# Patient Record
Sex: FEMALE | Race: WHITE | NOT HISPANIC OR LATINO | URBAN - METROPOLITAN AREA
[De-identification: names, ages, dates, MRNs, and addresses within clinical notes are randomized per-mention and may not be internally consistent; named-entity substitution may affect disease eponyms.]

---

## 2021-08-03 ENCOUNTER — OFFICE VISIT (OUTPATIENT)
Dept: URBAN - METROPOLITAN AREA CLINIC 94 | Facility: CLINIC | Age: 46
End: 2021-08-03

## 2021-08-05 ENCOUNTER — LAB OUTSIDE AN ENCOUNTER (OUTPATIENT)
Dept: URBAN - METROPOLITAN AREA CLINIC 70 | Facility: CLINIC | Age: 46
End: 2021-08-05

## 2021-08-05 ENCOUNTER — OFFICE VISIT (OUTPATIENT)
Dept: URBAN - METROPOLITAN AREA CLINIC 70 | Facility: CLINIC | Age: 46
End: 2021-08-05
Payer: COMMERCIAL

## 2021-08-05 ENCOUNTER — WEB ENCOUNTER (OUTPATIENT)
Dept: URBAN - METROPOLITAN AREA CLINIC 70 | Facility: CLINIC | Age: 46
End: 2021-08-05

## 2021-08-05 DIAGNOSIS — R10.84 GENERALIZED ABDOMINAL PAIN: ICD-10-CM

## 2021-08-05 DIAGNOSIS — R19.4 CHANGE IN BOWEL HABITS: ICD-10-CM

## 2021-08-05 DIAGNOSIS — K59.00 COLONIC CONSTIPATION: ICD-10-CM

## 2021-08-05 PROBLEM — 35298007: Status: ACTIVE | Noted: 2021-08-05

## 2021-08-05 PROCEDURE — 99204 OFFICE O/P NEW MOD 45 MIN: CPT | Performed by: INTERNAL MEDICINE

## 2021-08-05 RX ORDER — FLUTICASONE PROPIONATE AND SALMETEROL 50; 500 UG/1; UG/1
1 PUFF POWDER RESPIRATORY (INHALATION) TWICE A DAY
Status: ACTIVE | COMMUNITY

## 2021-08-05 RX ORDER — HYDROCODONE BITARTRATE AND ACETAMINOPHEN 5; 325 MG/1; MG/1
TABLET ORAL
Qty: 6 | Status: ACTIVE | COMMUNITY

## 2021-08-05 RX ORDER — DICLOFENAC SODIUM 75 MG/1
TABLET, DELAYED RELEASE ORAL
Qty: 60 | Status: ACTIVE | COMMUNITY

## 2021-08-05 RX ORDER — ALBUTEROL SULFATE 90 UG/1
1 PUFF AS NEEDED AEROSOL, METERED RESPIRATORY (INHALATION)
Status: ACTIVE | COMMUNITY

## 2021-08-05 NOTE — HPI-TODAY'S VISIT:
Patient presents today for evaluation of constipation that has been present for several years.  Feels her constipation is progressively become more of a problem.  Defecation occurs about once every 7-10 days. Denies experiencing the urge for defecation.  Voices increased bloating, abdominal discomfort/pain, back pain and nausea.  These symptoms prompt her to take medication to have a BM.  Has tried and failed use of magnesium citrate, OTC laxatives, stool softeners and senna to induce defecation.  Denies signs of GI blood loss.  Voices her last BM as being one day ago.  Stools are usually soft due to medication use with straining still voiced.    Was evaluated in ED at [facility] 2 days ago due to increased abdominal pain to RLQ regions and constipation.  Records reviewed via her phone with the following:  WBC 18.10, HGB 14.1, MCV 93.5, , Sodium 136, Potassium 4.1, Normal LFTs.  CT A/P showed normal appendix, no signs of bowel obstruction or bowel wall thickening, normal gallbladder.  Pelvic Ultrasound showed bilateral complex ovarian cysts.  Patient was discharged home and instructed to follow up with her GYN.  Denies prior colonoscopy.

## 2021-08-06 LAB — TSH: 0.56

## 2021-08-09 ENCOUNTER — OFFICE VISIT (OUTPATIENT)
Dept: URBAN - METROPOLITAN AREA CLINIC 94 | Facility: CLINIC | Age: 46
End: 2021-08-09

## 2021-08-19 ENCOUNTER — OFFICE VISIT (OUTPATIENT)
Dept: URBAN - METROPOLITAN AREA SURGERY CENTER 17 | Facility: SURGERY CENTER | Age: 46
End: 2021-08-19
Payer: COMMERCIAL

## 2021-08-19 DIAGNOSIS — K59.09 CHANGE IN BOWEL MOVEMENTS INTERMITTENT CONSTIPATION. URGENCY IN THE MORNING.: ICD-10-CM

## 2021-08-19 DIAGNOSIS — R10.84 ABDOMINAL CRAMPING, GENERALIZED: ICD-10-CM

## 2021-08-19 PROCEDURE — G8907 PT DOC NO EVENTS ON DISCHARG: HCPCS | Performed by: INTERNAL MEDICINE

## 2021-08-19 PROCEDURE — 45378 DIAGNOSTIC COLONOSCOPY: CPT | Performed by: INTERNAL MEDICINE

## 2021-08-19 RX ORDER — ALBUTEROL SULFATE 90 UG/1
1 PUFF AS NEEDED AEROSOL, METERED RESPIRATORY (INHALATION)
Status: ACTIVE | COMMUNITY

## 2021-08-19 RX ORDER — FLUTICASONE PROPIONATE AND SALMETEROL 50; 500 UG/1; UG/1
1 PUFF POWDER RESPIRATORY (INHALATION) TWICE A DAY
Status: ACTIVE | COMMUNITY

## 2021-08-19 RX ORDER — DICLOFENAC SODIUM 75 MG/1
TABLET, DELAYED RELEASE ORAL
Qty: 60 | Status: ACTIVE | COMMUNITY

## 2021-08-19 RX ORDER — HYDROCODONE BITARTRATE AND ACETAMINOPHEN 5; 325 MG/1; MG/1
TABLET ORAL
Qty: 6 | Status: ACTIVE | COMMUNITY

## 2021-11-16 ENCOUNTER — TELEPHONE ENCOUNTER (OUTPATIENT)
Dept: URBAN - METROPOLITAN AREA CLINIC 70 | Facility: CLINIC | Age: 46
End: 2021-11-16

## 2021-11-16 RX ORDER — LINACLOTIDE 290 UG/1
1 CAPSULE AT LEAST 30 MINUTES BEFORE THE FIRST MEAL OF THE DAY ON AN EMPTY STOMACH CAPSULE, GELATIN COATED ORAL ONCE A DAY
Qty: 90 | Refills: 3 | OUTPATIENT
Start: 2021-11-16

## 2022-02-07 ENCOUNTER — OFFICE VISIT (OUTPATIENT)
Dept: URBAN - METROPOLITAN AREA CLINIC 94 | Facility: CLINIC | Age: 47
End: 2022-02-07
Payer: COMMERCIAL

## 2022-02-07 VITALS
WEIGHT: 144 LBS | DIASTOLIC BLOOD PRESSURE: 78 MMHG | SYSTOLIC BLOOD PRESSURE: 124 MMHG | HEART RATE: 79 BPM | HEIGHT: 64 IN | BODY MASS INDEX: 24.59 KG/M2 | TEMPERATURE: 97.2 F

## 2022-02-07 DIAGNOSIS — K59.01 SLOW TRANSIT CONSTIPATION: ICD-10-CM

## 2022-02-07 PROCEDURE — 99214 OFFICE O/P EST MOD 30 MIN: CPT | Performed by: INTERNAL MEDICINE

## 2022-02-07 RX ORDER — FLUTICASONE PROPIONATE AND SALMETEROL 50; 500 UG/1; UG/1
1 PUFF POWDER RESPIRATORY (INHALATION) TWICE A DAY
Status: ACTIVE | COMMUNITY

## 2022-02-07 RX ORDER — PRUCALOPRIDE 2 MG/1
1 TABLET TABLET, FILM COATED ORAL ONCE A DAY
Qty: 30 TABLET | Refills: 6 | OUTPATIENT
Start: 2022-02-07 | End: 2022-09-05

## 2022-02-07 RX ORDER — LINACLOTIDE 290 UG/1
1 CAPSULE AT LEAST 30 MINUTES BEFORE THE FIRST MEAL OF THE DAY ON AN EMPTY STOMACH CAPSULE, GELATIN COATED ORAL ONCE A DAY
Qty: 90 | Refills: 3 | Status: ACTIVE | COMMUNITY
Start: 2021-11-16

## 2022-02-07 RX ORDER — HYDROCODONE BITARTRATE AND ACETAMINOPHEN 5; 325 MG/1; MG/1
TABLET ORAL
Qty: 6 | Status: ON HOLD | COMMUNITY

## 2022-02-07 RX ORDER — DICLOFENAC SODIUM 75 MG/1
TABLET, DELAYED RELEASE ORAL
Qty: 60 | Status: ACTIVE | COMMUNITY

## 2022-02-07 RX ORDER — ALBUTEROL SULFATE 90 UG/1
1 PUFF AS NEEDED AEROSOL, METERED RESPIRATORY (INHALATION)
Status: ACTIVE | COMMUNITY

## 2022-02-07 NOTE — HPI-TODAY'S VISIT:
Pt had a colonoscopy by Dr Aburto for evaluation of constipation in 8/2021, which was normal. Pt was started on linzess 290 mcg/day. Pt states that linzess has not helped her constipation. Pt has average of 1 BM every 4-5 days while taking linzess everyday. She has fullness and incomplete evacuation. Denies rectal bleeding or weight loss. Takes hydrocodone only once in a while for knee pain.

## 2022-05-03 ENCOUNTER — WEB ENCOUNTER (OUTPATIENT)
Dept: URBAN - METROPOLITAN AREA CLINIC 94 | Facility: CLINIC | Age: 47
End: 2022-05-03

## 2022-05-09 ENCOUNTER — OFFICE VISIT (OUTPATIENT)
Dept: URBAN - METROPOLITAN AREA CLINIC 94 | Facility: CLINIC | Age: 47
End: 2022-05-09
Payer: COMMERCIAL

## 2022-05-09 ENCOUNTER — DASHBOARD ENCOUNTERS (OUTPATIENT)
Age: 47
End: 2022-05-09

## 2022-05-09 ENCOUNTER — LAB OUTSIDE AN ENCOUNTER (OUTPATIENT)
Dept: URBAN - METROPOLITAN AREA CLINIC 94 | Facility: CLINIC | Age: 47
End: 2022-05-09

## 2022-05-09 VITALS
HEART RATE: 66 BPM | HEIGHT: 64 IN | DIASTOLIC BLOOD PRESSURE: 80 MMHG | SYSTOLIC BLOOD PRESSURE: 122 MMHG | WEIGHT: 153 LBS | TEMPERATURE: 97 F | BODY MASS INDEX: 26.12 KG/M2

## 2022-05-09 DIAGNOSIS — K59.01 SLOW TRANSIT CONSTIPATION: ICD-10-CM

## 2022-05-09 DIAGNOSIS — R10.84 GENERALIZED ABDOMINAL PAIN: ICD-10-CM

## 2022-05-09 PROBLEM — 35298007: Status: ACTIVE | Noted: 2022-02-07

## 2022-05-09 PROCEDURE — 99214 OFFICE O/P EST MOD 30 MIN: CPT | Performed by: INTERNAL MEDICINE

## 2022-05-09 RX ORDER — PRUCALOPRIDE 2 MG/1
1 TABLET TABLET, FILM COATED ORAL ONCE A DAY
Qty: 30 TABLET | Refills: 6 | Status: ACTIVE | COMMUNITY
Start: 2022-02-07 | End: 2022-09-05

## 2022-05-09 RX ORDER — DICLOFENAC SODIUM 75 MG/1
TABLET, DELAYED RELEASE ORAL
Qty: 60 | Status: ON HOLD | COMMUNITY

## 2022-05-09 RX ORDER — ALBUTEROL SULFATE 90 UG/1
1 PUFF AS NEEDED AEROSOL, METERED RESPIRATORY (INHALATION)
Status: ACTIVE | COMMUNITY

## 2022-05-09 RX ORDER — LINACLOTIDE 290 UG/1
1 CAPSULE AT LEAST 30 MINUTES BEFORE THE FIRST MEAL OF THE DAY ON AN EMPTY STOMACH CAPSULE, GELATIN COATED ORAL ONCE A DAY
Qty: 90 | Refills: 3 | Status: DISCONTINUED | COMMUNITY
Start: 2021-11-16

## 2022-05-09 RX ORDER — PLECANATIDE 3 MG/1
1 TABLET TABLET ORAL ONCE A DAY
Qty: 30 | Refills: 6 | OUTPATIENT

## 2022-05-09 RX ORDER — HYDROCODONE BITARTRATE AND ACETAMINOPHEN 5; 325 MG/1; MG/1
TABLET ORAL
Qty: 6 | Status: ON HOLD | COMMUNITY

## 2022-05-09 RX ORDER — FLUTICASONE PROPIONATE AND SALMETEROL 50; 500 UG/1; UG/1
1 PUFF POWDER RESPIRATORY (INHALATION) TWICE A DAY
Status: ACTIVE | COMMUNITY

## 2022-05-09 NOTE — HPI-TODAY'S VISIT:
Pt has chronic slow transit constipation. Pt had a colonoscopy by Dr Aburto for evaluation of constipation in 8/2021, which was normal. Pt was started on linzess 290 mcg/day which did not help her constipation. Pt was subsequently started on motegrity for chronic constipation. Pt states that motegrity worked for a while and then quit working,.  She has average 1-2 BMs per week. Stool hard and small. Bloating and distention present.  Pt states that she had a hysterectomy for endometriosis which has not helped with her GI symptoms. Denies rectal bleeding or weight loss.  Pt has diffuse generalized dull abdominal pain which worsens with constipation.  OTC miralax and laxatives do not help.

## 2022-05-19 ENCOUNTER — WEB ENCOUNTER (OUTPATIENT)
Dept: URBAN - METROPOLITAN AREA CLINIC 94 | Facility: CLINIC | Age: 47
End: 2022-05-19

## 2022-06-02 ENCOUNTER — WEB ENCOUNTER (OUTPATIENT)
Dept: URBAN - METROPOLITAN AREA CLINIC 94 | Facility: CLINIC | Age: 47
End: 2022-06-02

## 2022-06-21 ENCOUNTER — WEB ENCOUNTER (OUTPATIENT)
Dept: URBAN - METROPOLITAN AREA CLINIC 94 | Facility: CLINIC | Age: 47
End: 2022-06-21

## 2023-10-10 ENCOUNTER — OFFICE (OUTPATIENT)
Dept: URBAN - METROPOLITAN AREA CLINIC 19 | Facility: CLINIC | Age: 48
End: 2023-10-10
Payer: COMMERCIAL

## 2023-10-10 VITALS
HEIGHT: 65 IN | WEIGHT: 166 LBS | SYSTOLIC BLOOD PRESSURE: 134 MMHG | HEART RATE: 62 BPM | OXYGEN SATURATION: 99 % | DIASTOLIC BLOOD PRESSURE: 84 MMHG

## 2023-10-10 DIAGNOSIS — R10.11 RIGHT UPPER QUADRANT PAIN: ICD-10-CM

## 2023-10-10 DIAGNOSIS — R93.2 ABNORMAL FINDINGS ON DIAGNOSTIC IMAGING OF LIVER AND BILIARY: ICD-10-CM

## 2023-10-10 DIAGNOSIS — R74.01 ELEVATION OF LEVELS OF LIVER TRANSAMINASE LEVELS: ICD-10-CM

## 2023-10-10 DIAGNOSIS — K58.1 IRRITABLE BOWEL SYNDROME WITH CONSTIPATION: ICD-10-CM

## 2023-10-10 PROCEDURE — 99204 OFFICE O/P NEW MOD 45 MIN: CPT | Performed by: INTERNAL MEDICINE

## 2023-10-16 LAB
ACTIN (SMOOTH MUSCLE) ANTIBODY: 34 UNITS — HIGH (ref 0–19)
ALPHA-1-ANTITRYPSIN PHENOTYP: ALPHA-1-ANTITRYPSIN, SERUM: 146 MG/DL (ref 101–187)
ALPHA-1-ANTITRYPSIN PHENOTYP: PHENOTYPE (PI): (no result)
ANA, IFA RFX 9 MARK MULTIPLEX: ANA BY IFA RFX TITER/PATTERN: NEGATIVE
ANA, IFA RFX 9 MARK MULTIPLEX: PLEASE NOTE: (no result)
CERULOPLASMIN: 28.6 MG/DL (ref 19–39)
FE+TIBC+FER: FERRITIN: 64 NG/ML (ref 15–150)
FE+TIBC+FER: IRON BIND.CAP.(TIBC): 370 UG/DL (ref 250–450)
FE+TIBC+FER: IRON SATURATION: 37 % (ref 15–55)
FE+TIBC+FER: IRON: 138 UG/DL (ref 27–159)
FE+TIBC+FER: UIBC: 232 UG/DL (ref 131–425)
HBSAG SCREEN: NEGATIVE
HCV ANTIBODY: HEP C VIRUS AB: NON REACTIVE
HEP A AB, TOTAL: POSITIVE
HEP B CORE AB, TOT: NEGATIVE
HEP B SURFACE AB, QUAL: REACTIVE
HEPATIC FUNCTION PANEL (7): ALBUMIN: 4.5 G/DL (ref 3.9–4.9)
HEPATIC FUNCTION PANEL (7): ALKALINE PHOSPHATASE: 74 IU/L (ref 44–121)
HEPATIC FUNCTION PANEL (7): ALT (SGPT): 22 IU/L (ref 0–32)
HEPATIC FUNCTION PANEL (7): AST (SGOT): 17 IU/L (ref 0–40)
HEPATIC FUNCTION PANEL (7): BILIRUBIN, DIRECT: 0.15 MG/DL (ref 0–0.4)
HEPATIC FUNCTION PANEL (7): BILIRUBIN, TOTAL: 0.5 MG/DL (ref 0–1.2)
HEPATIC FUNCTION PANEL (7): PROTEIN, TOTAL: 7.4 G/DL (ref 6–8.5)